# Patient Record
Sex: FEMALE | Race: WHITE | NOT HISPANIC OR LATINO | Employment: UNEMPLOYED | URBAN - METROPOLITAN AREA
[De-identification: names, ages, dates, MRNs, and addresses within clinical notes are randomized per-mention and may not be internally consistent; named-entity substitution may affect disease eponyms.]

---

## 2019-01-24 ENCOUNTER — OFFICE VISIT (OUTPATIENT)
Dept: CARDIAC SURGERY | Facility: CLINIC | Age: 69
End: 2019-01-24

## 2019-01-24 VITALS
WEIGHT: 261.2 LBS | SYSTOLIC BLOOD PRESSURE: 122 MMHG | OXYGEN SATURATION: 99 % | TEMPERATURE: 97.8 F | HEIGHT: 65 IN | DIASTOLIC BLOOD PRESSURE: 67 MMHG | HEART RATE: 71 BPM | BODY MASS INDEX: 43.52 KG/M2

## 2019-01-24 DIAGNOSIS — I82.509 CHRONIC DEEP VEIN THROMBOSIS (DVT) OF LOWER EXTREMITY, UNSPECIFIED LATERALITY, UNSPECIFIED VEIN (HCC): Primary | ICD-10-CM

## 2019-01-24 PROCEDURE — 99203 OFFICE O/P NEW LOW 30 MIN: CPT | Performed by: THORACIC SURGERY (CARDIOTHORACIC VASCULAR SURGERY)

## 2019-01-24 RX ORDER — HYDROCODONE BITARTRATE AND ACETAMINOPHEN 7.5; 325 MG/1; MG/1
1 TABLET ORAL 3 TIMES DAILY
COMMUNITY
Start: 2019-01-23

## 2019-01-24 RX ORDER — LEVOTHYROXINE SODIUM 0.12 MG/1
1 TABLET ORAL DAILY
COMMUNITY
Start: 2018-11-05

## 2019-01-24 RX ORDER — FUROSEMIDE 40 MG/1
40 TABLET ORAL DAILY
COMMUNITY

## 2019-01-24 RX ORDER — METOPROLOL TARTRATE 50 MG/1
1 TABLET, FILM COATED ORAL 2 TIMES DAILY
COMMUNITY
Start: 2018-11-05

## 2019-01-24 RX ORDER — WARFARIN SODIUM 3 MG/1
1 TABLET ORAL DAILY
COMMUNITY
Start: 2019-01-03

## 2019-01-24 RX ORDER — GABAPENTIN 600 MG/1
1 TABLET ORAL 3 TIMES DAILY
COMMUNITY
Start: 2019-01-23

## 2019-01-24 NOTE — PROGRESS NOTES
01/24/2019  Patient Information  Lorna Santiago                                                                                          [address] [place] [address]   1950  'PCP/Referring Physician'  Smith An APRN  [phone]  Smith An, MORIAH*  [phone]  Chief Complaint   Patient presents with   • Consult     Referred by LYUBOV Claire for necrotic toes        History of Present Illness: The patient is a 68-year-old female referred by Dr. Matute of [facility] and duct and Smith An nurse practitioner for further evaluation of patient discoloration of the toes of both feet as well as the ulceration in the left heel.  The discoloration as well as a small ulcer of the left heel is been present for several months.  The patient did have a venous duplex of both lower extremities recently which showed no acute DVT and an arterial duplex was also done recently which showed a 40% left common femoral artery stenosis but trifurcation vessels had excellent waveforms bilaterally.  Of note is the fact the patient's had a pulmonary embolus twice in the past.  The first pulmonary embolus occurred postpartum in 1973 and the second pulmonary embolus occurred in May 2015 and she was placed on Coumadin at that there at that time when she was hospitalized at [facility] [place].      There is no problem list on file for this patient.    Past Medical History:   Diagnosis Date   • Bronchial asthma    • CHF (congestive heart failure) (CMS/HCC)    • Deep vein thrombosis (CMS/HCC)     left leg   • Disease of thyroid gland    • Hypertension    • Pulmonary embolism (CMS/HCC)    • Rheumatoid arthritis (CMS/HCC)      Past Surgical History:   Procedure Laterality Date   • CHOLECYSTECTOMY     • HYSTERECTOMY         Current Outpatient Medications:   •  furosemide (LASIX) 40 MG tablet, Take 40 mg by mouth Daily., Disp: , Rfl:   •  gabapentin (NEURONTIN) 600 MG tablet, Take 1  tablet by mouth 3 (Three) Times a Day., Disp: , Rfl:   •  HYDROcodone-acetaminophen (NORCO) 7.5-325 MG per tablet, Take 1 tablet by mouth 3 (Three) Times a Day., Disp: , Rfl:   •  levothyroxine (SYNTHROID, LEVOTHROID) 125 MCG tablet, Take 1 tablet by mouth Daily., Disp: , Rfl:   •  metoprolol tartrate (LOPRESSOR) 50 MG tablet, Take 1 tablet by mouth 2 (Two) Times a Day., Disp: , Rfl:   •  warfarin (COUMADIN) 3 MG tablet, Take 1 tablet by mouth Daily., Disp: , Rfl:   Allergies   Allergen Reactions   • Sulfa Antibiotics Hives and Swelling   • Latex Rash     Social History     Socioeconomic History   • Marital status: Unknown     Spouse name: Not on file   • Number of children: 4   • Years of education: Not on file   • Highest education level: Not on file   Social Needs   • Financial resource strain: Not on file   • Food insecurity - worry: Not on file   • Food insecurity - inability: Not on file   • Transportation needs - medical: Not on file   • Transportation needs - non-medical: Not on file   Occupational History   • Occupation:  homemaker     Employer: RETIRED   Tobacco Use   • Smoking status: Never Smoker   • Smokeless tobacco: Never Used   Substance and Sexual Activity   • Alcohol use: No     Frequency: Never   • Drug use: No   • Sexual activity: Not on file   Other Topics Concern   • Not on file   Social History Narrative    L[facility] in [facility]     Family History   Problem Relation Age of Onset   • Colon cancer Mother    • Liver cancer Mother    • Lung cancer Father    • Lung cancer Brother      Review of Systems   Constitution: Positive for malaise/fatigue. Negative for chills, fever, night sweats and weight loss.   HENT: Negative for hearing loss, odynophagia and sore throat.    Cardiovascular: Positive for dyspnea on exertion and leg swelling. Negative for chest pain, orthopnea and palpitations.   Respiratory: Negative for cough and hemoptysis.    Endocrine: Negative for cold intolerance, heat  "intolerance, polydipsia, polyphagia and polyuria.   Hematologic/Lymphatic: Bruises/bleeds easily.   Skin: Positive for color change. Negative for itching and rash.   Musculoskeletal: Positive for back pain, joint pain and muscle cramps. Negative for joint swelling and myalgias.   Gastrointestinal: Negative for abdominal pain, constipation, diarrhea, hematemesis, hematochezia, melena, nausea and vomiting.   Genitourinary: Negative for dysuria, frequency and hematuria.   Neurological: Positive for loss of balance. Negative for focal weakness, headaches, numbness and seizures.   Psychiatric/Behavioral: Positive for depression. Negative for suicidal ideas. The patient is nervous/anxious.    All other systems reviewed and are negative.    Vitals:    01/24/19 1316   BP: 122/67   BP Location: Right arm   Patient Position: Sitting   Pulse: 71   Temp: 97.8 °F (36.6 °C)   TempSrc: Temporal   SpO2: 99%   Weight: 118 kg (261 lb 3.2 oz)   Height: 165.1 cm (65\")      Physical Exam   Constitutional: She is oriented to person, place, and time. She appears well-developed and well-nourished.   The patient is morbidly obese for her height   HENT:   Head: Normocephalic and atraumatic.   Eyes: Conjunctivae are normal. Pupils are equal, round, and reactive to light. Right eye exhibits no discharge. Left eye exhibits no discharge.   Neck: Normal range of motion. No JVD present. No tracheal deviation present. No thyromegaly present.   Cardiovascular: Regular rhythm and normal heart sounds. Exam reveals no gallop and no friction rub.   No murmur heard.  I could not palpate a pedal pulses due to her peripheral edema but she did have strong dopplerable pedal pulses   Pulmonary/Chest: Effort normal and breath sounds normal. No stridor. No respiratory distress. She has no wheezes. She has no rales. She exhibits no tenderness.   Abdominal: Soft. Bowel sounds are normal. She exhibits no distension and no mass. There is no tenderness. There is no " rebound and no guarding.   Musculoskeletal: Normal range of motion. She exhibits edema. She exhibits no tenderness or deformity.   Lymphadenopathy:     She has no cervical adenopathy.   Neurological: She is alert and oriented to person, place, and time. She has normal reflexes.   Skin: Skin is warm and dry. No rash noted. No erythema. No pallor.   Patient has small ulceration on the left heel which appears to be a pressure ulceration and   Psychiatric: She has a normal mood and affect. Her behavior is normal. Judgment and thought content normal.    The toes of both feet has severe bluish discoloration especially at the distal two thirds of their length    Labs/Imaging: I did review the arterial duplex report which revealed a 40% stenosis of the left common femoral artery the venous duplex study of the lower extremities did not show any evidence of DVT    Assessment/Plan: 1.  Ulceration of the left heel appears to be a pressure ulceration probably due to poor fitting shoes.  #2.  History of pulmonary embolus ×2 due to deep venous thrombosis of the lower extremities.  #3.  Chronic venous stasis disease of both lower extremities secondary to prior deep venous thromboses resulting in bluish discoloration of all the toes of both feet  #4.  Probable element of congestive heart failure with bilateral peripheral edema  Plan: I stressed to the patient the need to use pressure support stockings of at least 20 mmHg pressure and to keep her feet elevated whenever she is sitting in a chair.  Also I have stressed to the patient to the patient she needs to lose a fair amount of weight, at least 100 pounds, to try to minimize venous pressures peripherally.  She also needs to keep some type of occlusive dressing over this small ulceration of the left heel, optimally an OPTi foam type dressing.  She also needs to keep close follow-up of her congestive heart failure and to minimize her peripheral edema with her cardiologist at [place]  Dr. Aurora Hernandes .  There are no plans to formally see her back in follow-up.  There is no problem list on file for this patient.